# Patient Record
Sex: FEMALE | Race: BLACK OR AFRICAN AMERICAN | NOT HISPANIC OR LATINO | Employment: OTHER | URBAN - METROPOLITAN AREA
[De-identification: names, ages, dates, MRNs, and addresses within clinical notes are randomized per-mention and may not be internally consistent; named-entity substitution may affect disease eponyms.]

---

## 2020-06-17 PROBLEM — E11.9 TYPE 2 DIABETES MELLITUS, WITHOUT LONG-TERM CURRENT USE OF INSULIN: Status: ACTIVE | Noted: 2020-06-17

## 2020-06-17 PROBLEM — E66.01 SEVERE OBESITY (BMI >= 40): Status: ACTIVE | Noted: 2020-06-17

## 2020-06-17 PROBLEM — I10 HYPERTENSION: Status: ACTIVE | Noted: 2020-06-17

## 2020-06-17 PROBLEM — J45.909 ASTHMA: Status: ACTIVE | Noted: 2020-06-17

## 2020-06-17 PROBLEM — R56.9 SEIZURES: Status: ACTIVE | Noted: 2020-06-17

## 2020-06-17 PROBLEM — N93.9 ABNORMAL UTERINE BLEEDING: Status: ACTIVE | Noted: 2020-06-17

## 2020-06-17 PROBLEM — D64.9 SYMPTOMATIC ANEMIA: Status: ACTIVE | Noted: 2020-06-17

## 2020-06-18 PROBLEM — D50.0 IRON DEFICIENCY ANEMIA DUE TO CHRONIC BLOOD LOSS: Chronic | Status: ACTIVE | Noted: 2020-06-18

## 2020-06-18 PROBLEM — D50.0 IRON DEFICIENCY ANEMIA DUE TO CHRONIC BLOOD LOSS: Status: ACTIVE | Noted: 2020-06-18

## 2020-06-18 PROBLEM — D75.839 THROMBOCYTOSIS: Status: ACTIVE | Noted: 2020-06-18

## 2020-08-14 PROBLEM — J45.21 MILD INTERMITTENT ASTHMA WITH ACUTE EXACERBATION: Status: ACTIVE | Noted: 2020-06-17

## 2020-08-14 PROBLEM — F31.9 BIPOLAR 1 DISORDER: Status: ACTIVE | Noted: 2020-08-14

## 2020-08-14 PROBLEM — J45.20 MILD INTERMITTENT ASTHMA WITHOUT COMPLICATION: Status: ACTIVE | Noted: 2020-06-17

## 2020-08-14 PROBLEM — R06.02 SOB (SHORTNESS OF BREATH): Status: ACTIVE | Noted: 2020-08-14

## 2020-08-15 PROBLEM — J45.21 MILD INTERMITTENT ASTHMA WITH ACUTE EXACERBATION: Status: RESOLVED | Noted: 2020-06-17 | Resolved: 2020-08-15

## 2020-08-15 PROBLEM — R56.9 SEIZURES: Status: RESOLVED | Noted: 2020-06-17 | Resolved: 2020-08-15

## 2020-08-15 PROBLEM — R06.02 SOB (SHORTNESS OF BREATH): Status: RESOLVED | Noted: 2020-08-14 | Resolved: 2020-08-15

## 2020-08-17 ENCOUNTER — NURSE TRIAGE (OUTPATIENT)
Dept: ADMINISTRATIVE | Facility: CLINIC | Age: 35
End: 2020-08-17

## 2020-08-17 NOTE — TELEPHONE ENCOUNTER
Spoke to patient and denies having difficulties at present.  Encouraged to go to nearest Ed or Urgent Care if begin to have problems.  Verbalized understanding of instructions.

## 2020-08-17 NOTE — TELEPHONE ENCOUNTER
Pt verified identity by spelling first and last name and verifying . Pt states that she doesn't nor does anyone else within her home have a smartphone or technology access to be able to participate in COVID-19 Surveillance. Informed pt of [id number] and she states that she can send and receive text messages and would like to participate. [id number] added per Dr. Harvey Mancia standing order. Pt c/o SOB-1 w/activity. Pt has not obtained her pulse oximeter from pharmacy at this time. Instructed pt that the pulse oximeter resides at [facility]. Pt negative for stridor, wheezing or acute distress. Pt states that her stomach was hurting and her head was spinning while sitting up playing [employer] last night and also states she didn't rest well because she was up and down all night, and reports that she vomited once last night, and that she was also nauseated all of which she claims has resolved as of this assessment. Care advice provided and pt instructed that symptoms require to be evaluated by her PCP if she can get in to see them and if not, she should go to [facility] or ED now per protocol disposition; pt voiced verbal understanding and states that she feels 'OK' now and refused disposition at this time. Advised pt that by refusing to follow protocol disposition that she could experience a negative or sub-optimal outcome; pt voiced verbal understanding and agrees with advice. Instructed pt to consider self as infectious and to follow social distancing, vigorous handwashing, cover cough and sneezes, wipe down cell phone several times daily with an antibacterial wipe, and quarantine techniques; pt voiced verbal understanding and agrees with information. Instructed pt to call [facility] back for further assistance if needed or if symptoms worsened and call 911 for all emergencies; pt voiced verbal understanding and agrees with goals. Pt doesn't have an [facility] provider and therefore, encounter cannot be sent to his current  PCP. Encounter routed to prescribing provider, GUI and .    Reason for Disposition   MILD difficulty breathing (e.g., minimal/no SOB at rest, SOB with walking, pulse <100)    Additional Information   Negative: SEVERE difficulty breathing (e.g., struggling for each breath, speaks in single words)   Negative: Difficult to awaken or acting confused (e.g., disoriented, slurred speech)   Negative: Bluish (or gray) lips or face now   Negative: Shock suspected (e.g., cold/pale/clammy skin, too weak to stand, low BP, rapid pulse)   Negative: Sounds like a life-threatening emergency to the triager   Negative: [1] COVID-19 exposure AND [2] NO symptoms   Negative: COVID-19 and Breastfeeding, questions about   Negative: SEVERE or constant chest pain or pressure (Exception: mild central chest pain, present only when coughing)   Negative: MODERATE difficulty breathing (e.g., speaks in phrases, SOB even at rest, pulse 100-120)    Protocols used: CORONAVIRUS (COVID-19) DIAGNOSED OR [id number]

## 2020-09-03 PROBLEM — Z91.89 HAS DIFFICULTY ACCESSING PRIMARY CARE PROVIDER FOR MOST VISITS: Status: ACTIVE | Noted: 2020-09-03

## 2020-09-03 PROBLEM — H92.01 EAR PAIN, RIGHT: Status: ACTIVE | Noted: 2020-09-03

## 2022-04-30 PROBLEM — R06.00 DYSPNEA: Status: ACTIVE | Noted: 2020-08-14

## 2022-04-30 PROBLEM — J45.909 UNCOMPLICATED ASTHMA: Status: ACTIVE | Noted: 2022-04-30

## 2022-04-30 PROBLEM — G40.909 SEIZURE DISORDER: Status: ACTIVE | Noted: 2020-06-17

## 2022-04-30 PROBLEM — E87.6 HYPOKALEMIA: Status: ACTIVE | Noted: 2022-04-30

## 2023-01-01 DIAGNOSIS — U07.1 COVID-19 VIRUS DETECTED: ICD-10-CM

## 2023-01-02 PROBLEM — D62 ACUTE BLOOD LOSS ANEMIA: Status: ACTIVE | Noted: 2023-01-02

## 2023-01-02 PROBLEM — E66.9 OBESITY, CLASS II, BMI 35-39.9: Status: ACTIVE | Noted: 2023-01-02

## 2023-01-02 PROBLEM — U07.1 COVID: Status: ACTIVE | Noted: 2023-01-02

## 2023-01-02 PROBLEM — E11.9 TYPE 2 DIABETES MELLITUS, WITHOUT LONG-TERM CURRENT USE OF INSULIN: Status: RESOLVED | Noted: 2020-06-17 | Resolved: 2023-01-02

## 2023-01-03 PROBLEM — F20.9 SCHIZOPHRENIA: Status: ACTIVE | Noted: 2023-01-03

## 2023-01-03 PROBLEM — D64.9 SYMPTOMATIC ANEMIA: Status: RESOLVED | Noted: 2020-06-17 | Resolved: 2023-01-03
